# Patient Record
Sex: MALE | Race: WHITE | Employment: UNEMPLOYED | URBAN - METROPOLITAN AREA
[De-identification: names, ages, dates, MRNs, and addresses within clinical notes are randomized per-mention and may not be internally consistent; named-entity substitution may affect disease eponyms.]

---
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
## 2025-06-16 ENCOUNTER — OFFICE VISIT (OUTPATIENT)
Dept: URGENT CARE | Facility: URGENT CARE | Age: 20
End: 2025-06-16
Payer: COMMERCIAL

## 2025-06-16 VITALS
HEART RATE: 82 BPM | SYSTOLIC BLOOD PRESSURE: 126 MMHG | WEIGHT: 197 LBS | BODY MASS INDEX: 25.28 KG/M2 | DIASTOLIC BLOOD PRESSURE: 82 MMHG | HEIGHT: 74 IN | TEMPERATURE: 98.4 F | RESPIRATION RATE: 14 BRPM | OXYGEN SATURATION: 100 %

## 2025-06-16 DIAGNOSIS — H61.21 IMPACTED CERUMEN OF RIGHT EAR: Primary | ICD-10-CM

## 2025-06-16 PROCEDURE — 69209 REMOVE IMPACTED EAR WAX UNI: CPT | Mod: RT | Performed by: PHYSICIAN ASSISTANT

## 2025-06-16 PROCEDURE — 99203 OFFICE O/P NEW LOW 30 MIN: CPT | Mod: 25 | Performed by: PHYSICIAN ASSISTANT

## 2025-06-16 NOTE — PROGRESS NOTES
Urgent Care Clinic Visit    Chief Complaint   Patient presents with    Ear Problem     Right  Ear issue - hard to hear 5+ Days               6/16/2025    12:27 PM   Additional Questions   Roomed by bill   Accompanied by self

## 2025-06-16 NOTE — PROGRESS NOTES
"URGENT CARE VISIT:    SUBJECTIVE:   Brian Keita is a 20 year old male presenting with a chief complaint of right  decreased hearing.  Onset was 5 day(s) ago.   He denies the following symptoms: ear pain, fever, chills, and stuffy nose  Course of illness is same.    Treatment measures tried include none with no relief of symptoms.  Predisposing factors include none.    PMH:   Past Medical History:   Diagnosis Date    NO ACTIVE PROBLEMS      Allergies: Pollen extract   Medications:   Current Outpatient Medications   Medication Sig Dispense Refill    loratadine (CLARITIN) 10 MG tablet Take 1 tablet (10 mg) by mouth daily (Patient not taking: Reported on 6/16/2025) 30 tablet 1     Social History:   Social History     Tobacco Use    Smoking status: Never    Smokeless tobacco: Never   Substance Use Topics    Alcohol use: No       ROS:  Review of systems negative except as stated above.    OBJECTIVE:  /82 (BP Location: Right arm, Patient Position: Chair, Cuff Size: Adult Regular)   Pulse 82   Temp 98.4  F (36.9  C) (Oral)   Resp 14   Ht 1.88 m (6' 2\")   Wt 89.4 kg (197 lb)   SpO2 100%   BMI 25.29 kg/m    GENERAL APPEARANCE: healthy, alert and no distress  EYES: conjunctiva clear  HENT: TM's normal.  Copious cerumen present in right external ear canal.   NECK: supple, nontender, no lymphadenopathy  RESP: lungs clear to auscultation - no rales, rhonchi or wheezes  CV: regular rates and rhythm, normal S1 S2, no murmur noted  SKIN: no suspicious lesions or rashes      ASSESSMENT:    ICD-10-CM    1. Impacted cerumen of right ear  H61.21 NH REMOVAL IMPACTED CERUMEN IRRIGATION/LVG UNILAT          PROCEDURE: Ear irrigation  Ear wax was successfully removed with irrigation. Patient tolerated the procedure well.     PLAN:  Patient Instructions   Patient was educated on the natural  course of the condition. Conservative measures to prevent ear wax build up including applying a few drops of mineral oil or earwax " softener (Debrox) were discussed.  Avoid using q tips as this may push ear wax further in to the ear canal.  See your primary care provider if symptoms worsen or do not improve in 7 days. Seek emergency care if you develop severe ear pain or fever over 103.     Patient verbalized understanding and is agreeable to plan. The patient was discharged ambulatory and in stable condition.    Minerva Gutierrez PA-C on 6/16/2025 at 12:37 PM